# Patient Record
Sex: MALE | Race: WHITE | URBAN - METROPOLITAN AREA
[De-identification: names, ages, dates, MRNs, and addresses within clinical notes are randomized per-mention and may not be internally consistent; named-entity substitution may affect disease eponyms.]

---

## 2023-06-07 ENCOUNTER — APPOINTMENT (RX ONLY)
Dept: URBAN - METROPOLITAN AREA CLINIC 310 | Facility: CLINIC | Age: 58
Setting detail: DERMATOLOGY
End: 2023-06-07

## 2023-06-07 DIAGNOSIS — L20.89 OTHER ATOPIC DERMATITIS: ICD-10-CM | Status: INADEQUATELY CONTROLLED

## 2023-06-07 DIAGNOSIS — L01.01 NON-BULLOUS IMPETIGO: ICD-10-CM | Status: WORSENING

## 2023-06-07 PROCEDURE — 99204 OFFICE O/P NEW MOD 45 MIN: CPT

## 2023-06-07 PROCEDURE — ? PRESCRIPTION

## 2023-06-07 PROCEDURE — ? ADDITIONAL NOTES

## 2023-06-07 PROCEDURE — ? DUPIXENT INITIATION

## 2023-06-07 PROCEDURE — ? COUNSELING

## 2023-06-07 RX ORDER — DUPILUMAB 300 MG/2ML
INJECTION, SOLUTION SUBCUTANEOUS
Qty: 1 | Refills: 12 | Status: ERX | COMMUNITY
Start: 2023-06-07

## 2023-06-07 RX ORDER — CEFADROXIL 500 MG/1
CAPSULE ORAL
Qty: 14 | Refills: 0 | Status: ERX | COMMUNITY
Start: 2023-06-07

## 2023-06-07 RX ADMIN — DUPILUMAB: 300 INJECTION, SOLUTION SUBCUTANEOUS at 00:00

## 2023-06-07 RX ADMIN — CEFADROXIL: 500 CAPSULE ORAL at 00:00

## 2023-06-07 ASSESSMENT — LOCATION DETAILED DESCRIPTION DERM
LOCATION DETAILED: RIGHT ULNAR DORSAL HAND
LOCATION DETAILED: RIGHT DORSAL SMALL METACARPOPHALANGEAL JOINT
LOCATION DETAILED: LEFT ULNAR DORSAL HAND
LOCATION DETAILED: RIGHT ANKLE
LOCATION DETAILED: RIGHT MID DORSAL SMALL FINGER
LOCATION DETAILED: LEFT ANKLE

## 2023-06-07 ASSESSMENT — LOCATION ZONE DERM
LOCATION ZONE: HAND
LOCATION ZONE: LEG
LOCATION ZONE: FINGER

## 2023-06-07 ASSESSMENT — BSA ECZEMA: % BODY COVERED IN ECZEMA: 2

## 2023-06-07 ASSESSMENT — LOCATION SIMPLE DESCRIPTION DERM
LOCATION SIMPLE: RIGHT HAND
LOCATION SIMPLE: RIGHT ANKLE
LOCATION SIMPLE: RIGHT SMALL FINGER
LOCATION SIMPLE: LEFT ANKLE
LOCATION SIMPLE: LEFT HAND

## 2023-06-07 ASSESSMENT — ITCH NUMERIC RATING SCALE: HOW SEVERE IS YOUR ITCHING?: 7

## 2023-06-07 NOTE — PROCEDURE: DUPIXENT INITIATION
Is Soriatane Contraindicated?: No
Dupixent Dosing: 600 mg SC day 0 then 300 mg SC every other week
Pregnancy And Lactation Warning Text: There have not been adverse fetal risks in women taking Dupixent while pregnant. It is unknown if this medication is excreted in breast milk.
Dupixent Dosing Override: 400 SC day 1 then 200 SC q 2 weeks
Detail Level: Zone
Diagnosis (Required): Atopic Dermatitis/Eczematous Dermatitis
Dupixent Monitoring Guidelines: There is no laboratory monitoring requirement with Dupixent.

## 2023-06-07 NOTE — PROCEDURE: ADDITIONAL NOTES
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  Was previously clear on Dupixent until a few weeks ago due to a lapse in therapy.  Now has recurrence on his hands and feet.  States this looks exactly like his skin looked prior to dupixent therapy.  Started prior authorization for Dupixent today.  Patient's hands are flaring so badly, he finds it difficult to do his work duties.  Patient provided with an injection per below and was given a sample to use in 2 weeks.  He was also provided with Opezlura samples to use as a rescue therapy on his hands and feet until the dupixent starts working.  Patient to call if the Opezlura burns too badly to use.  Patient was advised to call us if he does not hear from our clinic regarding a prior authorization update in 2-3 weeks.   Photos taken today.  RTC in 2 months.  Treatment for secondary impetigo per below.  \\n\\nAmount Given: 300 mg\\nSyringe Used: 300 mg/2ml prefilled pen (Medication Provided by Patient)\\[facility]: [address]-50\\nLot Number: [id number]\\nExpiration Date: 10/31/24\\nAdministered By: Javed Lane MA\\nThe risks of pain and injection site reactions were reviewed with the patient prior to the injection. After consent was obtained the left lateral abdomen was cleaned with alcohol and 300 mg of Dupixent was injected subcutaneously. 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  Was previously clear on Dupixent until a few weeks ago due to a lapse in therapy.  Now has recurrence on his hands and feet.  States this looks exactly like his skin looked prior to dupixent therapy.  Started prior authorization for Dupixent today.  Patient's hands are flaring so badly, he finds it difficult to do his work duties.  Patient provided with an injection per below and was given a sample to use in 2 weeks.  He was also provided with Opezlura samples to use as a rescue therapy on his hands and feet until the dupixent starts working.  Patient to call if the Opezlura burns too badly to use.  Patient was advised to call us if he does not hear from our clinic regarding a prior authorization update in 2-3 weeks.   Photos taken today.  RTC in 2 months.  Treatment for secondary impetigo per below.  \\n\\nAmount Given: 300 mg\\nSyringe Used: 300 mg/2ml prefilled pen (Medication Provided by Patient)\\[facility]: [id number]\\nLot Number: [id number]\\nExpiration Date: 10/31/24\\nAdministered By: Javed Lane MA\\nThe risks of pain and injection site reactions were reviewed with the patient prior to the injection. After consent was obtained the left lateral abdomen was cleaned with alcohol and 300 mg of Dupixent was injected subcutaneously.

## 2023-06-07 NOTE — HPI: RASH
How Severe Is Your Rash?: severe
Is This A New Presentation, Or A Follow-Up?: Rash
Additional History: Pt presents for further treatment of previously diagnosed eczema that he was treating with Dupixent. Pt states his insurance changed and has been off the medication for 6 weeks and just wants to get back on it. He states he was 90%+ clear while on Dupixent, but is currently flaring on his hands and feet. Pt states prior to Dupixent he had rash on full legs, hands and feet that was so bad they had to do wound care. Pt states he has tried and failed topical steroids including triamcinolone and clobetasol, oral prednisone, and Humira.

## 2023-06-20 ENCOUNTER — APPOINTMENT (RX ONLY)
Dept: URBAN - METROPOLITAN AREA CLINIC 308 | Facility: CLINIC | Age: 58
Setting detail: DERMATOLOGY
End: 2023-06-20

## 2023-06-20 DIAGNOSIS — L20.89 OTHER ATOPIC DERMATITIS: ICD-10-CM

## 2023-06-20 PROCEDURE — ? COUNSELING

## 2023-06-20 PROCEDURE — ? PRESCRIPTION

## 2023-06-20 PROCEDURE — ? PRESCRIPTION MEDICATION MANAGEMENT

## 2023-06-20 PROCEDURE — 99214 OFFICE O/P EST MOD 30 MIN: CPT

## 2023-06-20 PROCEDURE — ? ADDITIONAL NOTES

## 2023-06-20 RX ORDER — CLOBETASOL PROPIONATE 0.5 MG/G
OINTMENT TOPICAL
Qty: 60 | Refills: 1 | Status: ERX | COMMUNITY
Start: 2023-06-20

## 2023-06-20 RX ADMIN — CLOBETASOL PROPIONATE: 0.5 OINTMENT TOPICAL at 00:00

## 2023-06-20 ASSESSMENT — LOCATION ZONE DERM
LOCATION ZONE: LEG
LOCATION ZONE: HAND

## 2023-06-20 ASSESSMENT — LOCATION SIMPLE DESCRIPTION DERM
LOCATION SIMPLE: LEFT HAND
LOCATION SIMPLE: RIGHT ANKLE
LOCATION SIMPLE: LEFT ANKLE
LOCATION SIMPLE: RIGHT HAND

## 2023-06-20 ASSESSMENT — LOCATION DETAILED DESCRIPTION DERM
LOCATION DETAILED: RIGHT ULNAR DORSAL HAND
LOCATION DETAILED: LEFT ULNAR DORSAL HAND
LOCATION DETAILED: RIGHT ANKLE
LOCATION DETAILED: LEFT ANKLE

## 2023-06-20 NOTE — PROCEDURE: PRESCRIPTION MEDICATION MANAGEMENT
Plan: Recommended wet wraps QHS with Clobetasol BID vs opzelura
Detail Level: Zone
Render In Strict Bullet Format?: No
Continue Regimen: Dupixent 300 mg/2ml prefilled pen every other week
Initiate Treatment: clobetasol 0.05 % topical ointment topically to rash on hands BID. Do not use on face, underarms or groin

## 2023-06-20 NOTE — PROCEDURE: ADDITIONAL NOTES
Additional Notes: Amount Given: 300 mg\\nSyringe Used: 300 mg/2ml prefilled pen (Medication Provided by Patient)\\n[facility]: [address]-77\\nLot Number: [id number]\\nExpiration Date: 10-\\nAdministered By: Orquidea Hayward MA\\n\\nThe risks of pain and injection site reactions were reviewed with the patient prior to the injection. After consent was obtained the periumbilical skin was cleaned with alcohol and 300 mg of Dupixent was injected subcutaneously. Additional Notes: Amount Given: 300 mg\\nSyringe Used: 300 mg/2ml prefilled pen (Medication Provided by Patient)\\n[facility]: [address]-92\\nLot Number: [id number]\\nExpiration Date: 10-\\nAdministered By: Orquidea Hayward MA\\n\\nThe risks of pain and injection site reactions were reviewed with the patient prior to the injection. After consent was obtained the periumbilical skin was cleaned with alcohol and 300 mg of Dupixent was injected subcutaneously.

## 2023-06-20 NOTE — PROCEDURE: ADDITIONAL NOTES
Additional Notes: Dupixent injection administered today from sample supply per above. \\n Prior authorization still pending.  Patient's hands are improved but not at treatment goal.  He doesn't feel like the Opezlura is overly helpful.  Switched to clobetasol ointment bid with wet wraps qhs.  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 This was provided to him.  Recommended vanicream after hand washing.

## 2023-07-05 ENCOUNTER — APPOINTMENT (RX ONLY)
Dept: URBAN - METROPOLITAN AREA CLINIC 310 | Facility: CLINIC | Age: 58
Setting detail: DERMATOLOGY
End: 2023-07-05

## 2023-07-05 DIAGNOSIS — L20.89 OTHER ATOPIC DERMATITIS: ICD-10-CM

## 2023-07-05 PROCEDURE — 96372 THER/PROPH/DIAG INJ SC/IM: CPT

## 2023-07-05 PROCEDURE — ? DUPIXENT INJECTION

## 2023-07-05 PROCEDURE — ? COUNSELING

## 2023-07-05 ASSESSMENT — LOCATION SIMPLE DESCRIPTION DERM: LOCATION SIMPLE: ABDOMEN

## 2023-07-05 ASSESSMENT — LOCATION ZONE DERM: LOCATION ZONE: TRUNK

## 2023-07-05 ASSESSMENT — LOCATION DETAILED DESCRIPTION DERM: LOCATION DETAILED: PERIUMBILICAL SKIN

## 2023-07-05 NOTE — PROCEDURE: DUPIXENT INJECTION
Additional Comments: Dupixent injection administered today from sample supply per above. Dupixent prior authorization still pending.

## 2023-07-19 ENCOUNTER — APPOINTMENT (RX ONLY)
Dept: URBAN - METROPOLITAN AREA CLINIC 310 | Facility: CLINIC | Age: 58
Setting detail: DERMATOLOGY
End: 2023-07-19

## 2023-07-19 DIAGNOSIS — L20.89 OTHER ATOPIC DERMATITIS: ICD-10-CM

## 2023-07-19 PROCEDURE — ? COUNSELING

## 2023-07-19 PROCEDURE — ? DUPIXENT INJECTION

## 2023-07-19 PROCEDURE — 96372 THER/PROPH/DIAG INJ SC/IM: CPT

## 2023-07-19 RX ORDER — DUPILUMAB 300 MG/2ML
INJECTION, SOLUTION SUBCUTANEOUS
Qty: 1 | Refills: 12 | Status: ERX

## 2023-07-19 ASSESSMENT — LOCATION SIMPLE DESCRIPTION DERM: LOCATION SIMPLE: ABDOMEN

## 2023-07-19 ASSESSMENT — LOCATION ZONE DERM: LOCATION ZONE: TRUNK

## 2023-07-19 ASSESSMENT — LOCATION DETAILED DESCRIPTION DERM: LOCATION DETAILED: PERIUMBILICAL SKIN

## 2023-08-02 ENCOUNTER — APPOINTMENT (RX ONLY)
Dept: URBAN - METROPOLITAN AREA CLINIC 310 | Facility: CLINIC | Age: 58
Setting detail: DERMATOLOGY
End: 2023-08-02

## 2023-08-02 DIAGNOSIS — L20.89 OTHER ATOPIC DERMATITIS: ICD-10-CM

## 2023-08-02 DIAGNOSIS — L20.89 OTHER ATOPIC DERMATITIS: ICD-10-CM | Status: INADEQUATELY CONTROLLED

## 2023-08-02 PROCEDURE — ? ADDITIONAL NOTES

## 2023-08-02 PROCEDURE — 96372 THER/PROPH/DIAG INJ SC/IM: CPT

## 2023-08-02 PROCEDURE — ? COUNSELING

## 2023-08-02 PROCEDURE — ? DUPIXENT INJECTION

## 2023-08-02 PROCEDURE — 99214 OFFICE O/P EST MOD 30 MIN: CPT | Mod: 25

## 2023-08-02 PROCEDURE — ? PRESCRIPTION

## 2023-08-02 RX ORDER — CLOBETASOL PROPIONATE 0.5 MG/G
OINTMENT TOPICAL
Qty: 60 | Refills: 2 | Status: ERX

## 2023-08-02 ASSESSMENT — LOCATION DETAILED DESCRIPTION DERM
LOCATION DETAILED: LEFT PROXIMAL DORSAL MIDDLE FINGER
LOCATION DETAILED: RIGHT PROXIMAL RADIAL DORSAL RING FINGER
LOCATION DETAILED: PERIUMBILICAL SKIN
LOCATION DETAILED: RIGHT PROXIMAL DORSAL SMALL FINGER
LOCATION DETAILED: LEFT SMALL PROXIMAL INTERPHALANGEAL JOINT
LOCATION DETAILED: LEFT PROXIMAL DORSAL RING FINGER
LOCATION DETAILED: LEFT PROXIMAL DORSAL INDEX FINGER

## 2023-08-02 ASSESSMENT — LOCATION SIMPLE DESCRIPTION DERM
LOCATION SIMPLE: LEFT RING FINGER
LOCATION SIMPLE: LEFT MIDDLE FINGER
LOCATION SIMPLE: LEFT SMALL FINGER
LOCATION SIMPLE: RIGHT RING FINGER
LOCATION SIMPLE: LEFT INDEX FINGER
LOCATION SIMPLE: ABDOMEN
LOCATION SIMPLE: RIGHT SMALL FINGER

## 2023-08-02 ASSESSMENT — LOCATION ZONE DERM
LOCATION ZONE: TRUNK
LOCATION ZONE: FINGER

## 2023-08-02 NOTE — PROCEDURE: DUPIXENT INJECTION
Additional Comments: Dupixent injection administered today from sample supply per above. Dupixent prior authorization has been started on 7/19/23

## 2023-08-02 NOTE — PROCEDURE: ADDITIONAL NOTES
Additional Notes: Patient having a flare up on his hands for the past several days with cracking and fissuring.  Uncertain exact etiology of flare.  Recommended clobetasol bid with wet wraps at night and cetirizine 10 mg po bid until improved.  Discussed patch testing in depth although discussed he may have some false negatives on dupixnet.  Will see how much patient improves in 2 weeks and if not better or continues to flare on a regular basis, will consider patch testing.  Photos taken today.
Detail Level: Simple
Render Risk Assessment In Note?: yes

## 2023-08-16 ENCOUNTER — APPOINTMENT (RX ONLY)
Dept: URBAN - METROPOLITAN AREA CLINIC 310 | Facility: CLINIC | Age: 58
Setting detail: DERMATOLOGY
End: 2023-08-16

## 2023-08-16 DIAGNOSIS — L20.89 OTHER ATOPIC DERMATITIS: ICD-10-CM

## 2023-08-16 PROCEDURE — ? DUPIXENT INJECTION

## 2023-08-16 PROCEDURE — ? PRESCRIPTION MEDICATION MANAGEMENT

## 2023-08-16 PROCEDURE — 99214 OFFICE O/P EST MOD 30 MIN: CPT | Mod: 25

## 2023-08-16 PROCEDURE — 96372 THER/PROPH/DIAG INJ SC/IM: CPT

## 2023-08-16 PROCEDURE — ? COUNSELING

## 2023-08-16 ASSESSMENT — LOCATION DETAILED DESCRIPTION DERM
LOCATION DETAILED: LEFT PROXIMAL DORSAL INDEX FINGER
LOCATION DETAILED: LEFT SMALL PROXIMAL INTERPHALANGEAL JOINT
LOCATION DETAILED: LEFT PROXIMAL DORSAL MIDDLE FINGER
LOCATION DETAILED: RIGHT PROXIMAL RADIAL DORSAL RING FINGER
LOCATION DETAILED: RIGHT LATERAL ABDOMEN
LOCATION DETAILED: RIGHT PROXIMAL DORSAL SMALL FINGER
LOCATION DETAILED: LEFT PROXIMAL DORSAL RING FINGER

## 2023-08-16 ASSESSMENT — LOCATION SIMPLE DESCRIPTION DERM
LOCATION SIMPLE: ABDOMEN
LOCATION SIMPLE: LEFT INDEX FINGER
LOCATION SIMPLE: RIGHT RING FINGER
LOCATION SIMPLE: LEFT MIDDLE FINGER
LOCATION SIMPLE: LEFT SMALL FINGER
LOCATION SIMPLE: LEFT RING FINGER
LOCATION SIMPLE: RIGHT SMALL FINGER

## 2023-08-16 ASSESSMENT — LOCATION ZONE DERM
LOCATION ZONE: TRUNK
LOCATION ZONE: FINGER

## 2023-08-16 NOTE — PROCEDURE: PRESCRIPTION MEDICATION MANAGEMENT
Render In Strict Bullet Format?: No
Detail Level: Zone
Continue Regimen: Clobetasol PRN per flares\\nContinue dupixent q 2 weeks

## 2023-08-30 ENCOUNTER — APPOINTMENT (RX ONLY)
Dept: URBAN - METROPOLITAN AREA CLINIC 310 | Facility: CLINIC | Age: 58
Setting detail: DERMATOLOGY
End: 2023-08-30

## 2023-08-30 DIAGNOSIS — L20.89 OTHER ATOPIC DERMATITIS: ICD-10-CM

## 2023-08-30 PROCEDURE — 96372 THER/PROPH/DIAG INJ SC/IM: CPT

## 2023-08-30 PROCEDURE — ? DUPIXENT INJECTION

## 2023-08-30 PROCEDURE — ? COUNSELING

## 2023-08-30 ASSESSMENT — LOCATION SIMPLE DESCRIPTION DERM: LOCATION SIMPLE: ABDOMEN

## 2023-08-30 ASSESSMENT — LOCATION ZONE DERM: LOCATION ZONE: TRUNK

## 2023-08-30 ASSESSMENT — LOCATION DETAILED DESCRIPTION DERM: LOCATION DETAILED: PERIUMBILICAL SKIN
